# Patient Record
Sex: MALE | Race: OTHER
[De-identification: names, ages, dates, MRNs, and addresses within clinical notes are randomized per-mention and may not be internally consistent; named-entity substitution may affect disease eponyms.]

---

## 2019-08-13 ENCOUNTER — HOSPITAL ENCOUNTER (EMERGENCY)
Dept: HOSPITAL 54 - ER | Age: 39
Discharge: HOME | End: 2019-08-13
Payer: COMMERCIAL

## 2019-08-13 VITALS — HEIGHT: 72 IN | BODY MASS INDEX: 22.9 KG/M2 | WEIGHT: 169.06 LBS

## 2019-08-13 VITALS — DIASTOLIC BLOOD PRESSURE: 94 MMHG | SYSTOLIC BLOOD PRESSURE: 142 MMHG

## 2019-08-13 DIAGNOSIS — G40.909: Primary | ICD-10-CM

## 2019-08-13 DIAGNOSIS — Z98.890: ICD-10-CM

## 2019-08-13 NOTE — NUR
AA/OX4, NAD, VS STABLE, IV removed. Catheter intact and site benign. Pressure 
and 4x4 applied to site. No bleeding noted. Ambulatory with a steady gait. 
Patient discharged to home in stable condition. Written and verbal after care 
instructions given. Patient verbalizes understanding of instruction. TAP CARD 
PROVIDED.

## 2019-08-13 NOTE — NUR
[id number], HAD A SEIZURE, BS 92, PER PT HIS FRIEND SAID IT LASTED FOR 5MINS TAKES 
DEPAKOTE 100MG BID. AA/OX4, BREATHING EVEN AND UNLABORED, NO SOB NOTED, 
ATTACHED TO THE MONITOR, NEEDS ATTENDED, WILL MONITOR. DR. ROSE AT BEDSIDE FOR 
EVAL.